# Patient Record
Sex: FEMALE | Race: WHITE | Employment: FULL TIME
[De-identification: names, ages, dates, MRNs, and addresses within clinical notes are randomized per-mention and may not be internally consistent; named-entity substitution may affect disease eponyms.]

---

## 2017-05-08 ENCOUNTER — HOSPITAL (OUTPATIENT)
Dept: OTHER | Age: 52
End: 2017-05-08
Attending: ORTHOPAEDIC SURGERY

## 2021-07-15 DIAGNOSIS — Z12.31 ENCOUNTER FOR SCREENING MAMMOGRAM FOR MALIGNANT NEOPLASM OF BREAST: Primary | ICD-10-CM

## 2021-08-03 ENCOUNTER — HOSPITAL ENCOUNTER (OUTPATIENT)
Dept: MAMMOGRAPHY | Age: 56
Discharge: HOME | End: 2021-08-03
Attending: OBSTETRICS & GYNECOLOGY

## 2021-08-03 DIAGNOSIS — Z12.31 ENCOUNTER FOR SCREENING MAMMOGRAM FOR MALIGNANT NEOPLASM OF BREAST: ICD-10-CM

## 2021-08-03 PROCEDURE — 77063 BREAST TOMOSYNTHESIS BI: CPT

## 2021-08-03 PROCEDURE — 77067 SCR MAMMO BI INCL CAD: CPT

## 2021-09-01 ENCOUNTER — APPOINTMENT (OUTPATIENT)
Dept: CT IMAGING | Facility: HOSPITAL | Age: 56
End: 2021-09-01
Attending: EMERGENCY MEDICINE
Payer: OTHER MISCELLANEOUS

## 2021-09-01 ENCOUNTER — HOSPITAL ENCOUNTER (EMERGENCY)
Facility: HOSPITAL | Age: 56
Discharge: HOME | End: 2021-09-01
Attending: EMERGENCY MEDICINE
Payer: OTHER MISCELLANEOUS

## 2021-09-01 VITALS
DIASTOLIC BLOOD PRESSURE: 77 MMHG | WEIGHT: 110 LBS | HEIGHT: 61 IN | RESPIRATION RATE: 16 BRPM | TEMPERATURE: 98 F | HEART RATE: 57 BPM | BODY MASS INDEX: 20.77 KG/M2 | SYSTOLIC BLOOD PRESSURE: 122 MMHG | OXYGEN SATURATION: 97 %

## 2021-09-01 DIAGNOSIS — S09.8XXA BLUNT HEAD TRAUMA, INITIAL ENCOUNTER: Primary | ICD-10-CM

## 2021-09-01 PROCEDURE — 99284 EMERGENCY DEPT VISIT MOD MDM: CPT

## 2021-09-01 PROCEDURE — 70450 CT HEAD/BRAIN W/O DYE: CPT | Performed by: EMERGENCY MEDICINE

## 2021-09-01 RX ORDER — HYDROCODONE BITARTRATE AND ACETAMINOPHEN 5; 325 MG/1; MG/1
1 TABLET ORAL ONCE
Status: COMPLETED | OUTPATIENT
Start: 2021-09-01 | End: 2021-09-01

## 2021-09-01 NOTE — ED PROVIDER NOTES
Patient Seen in: [facility] Emergency Department      History   Patient presents with:  Trauma    Stated Complaint: softball to head    HPI/Subjective:   HPI    23-year-old female with past medical history significant for asthma presents emergency Nondistended. Soft. Bowel sounds are normal.   Back:   : Musculoskeletal: Normal range of motion. No deformity. Lymphadenopathy: No sig cervical LAD   Neurological: Awake, alert. Normal reflexes. No cranial nerve deficit.     Skin: Skin is warm and dr

## 2021-09-01 NOTE — ED INITIAL ASSESSMENT (HPI)
Pt via EMS from school where she is a . Pt was struck in head by softball. No LOC. No blood thinners. Complains of dizziness and mild headache.

## 2021-10-31 ENCOUNTER — WALK IN (OUTPATIENT)
Dept: URGENT CARE | Age: 56
End: 2021-10-31
Attending: EMERGENCY MEDICINE

## 2021-10-31 VITALS
HEART RATE: 67 BPM | SYSTOLIC BLOOD PRESSURE: 142 MMHG | TEMPERATURE: 98 F | BODY MASS INDEX: 20.2 KG/M2 | DIASTOLIC BLOOD PRESSURE: 83 MMHG | OXYGEN SATURATION: 99 % | WEIGHT: 107 LBS | HEIGHT: 61 IN | RESPIRATION RATE: 18 BRPM

## 2021-10-31 DIAGNOSIS — L03.011 PARONYCHIA OF FINGER OF RIGHT HAND: Primary | ICD-10-CM

## 2021-10-31 PROCEDURE — 26010 DRAINAGE OF FINGER ABSCESS: CPT

## 2021-10-31 PROCEDURE — 99202 OFFICE O/P NEW SF 15 MIN: CPT

## 2021-10-31 RX ORDER — CEPHALEXIN 500 MG/1
500 CAPSULE ORAL 3 TIMES DAILY
Qty: 21 CAPSULE | Refills: 0 | Status: SHIPPED | OUTPATIENT
Start: 2021-10-31 | End: 2021-11-07

## 2021-10-31 RX ORDER — ALBUTEROL SULFATE 90 UG/1
AEROSOL, METERED RESPIRATORY (INHALATION)
COMMUNITY
Start: 2021-06-08

## 2021-10-31 RX ORDER — MONTELUKAST SODIUM 10 MG/1
TABLET ORAL
COMMUNITY
Start: 2021-08-09

## 2021-10-31 RX ORDER — ESTRADIOL 0.03 MG/D
FILM, EXTENDED RELEASE TRANSDERMAL
COMMUNITY
Start: 2021-08-25

## 2021-10-31 ASSESSMENT — PAIN SCALES - GENERAL: PAINLEVEL: 8

## 2023-12-18 ENCOUNTER — HOSPITAL ENCOUNTER (EMERGENCY)
Facility: HOSPITAL | Age: 58
Discharge: HOME | End: 2023-12-18
Attending: EMERGENCY MEDICINE
Payer: COMMERCIAL

## 2023-12-18 ENCOUNTER — APPOINTMENT (OUTPATIENT)
Dept: GENERAL RADIOLOGY | Facility: HOSPITAL | Age: 58
End: 2023-12-18
Payer: COMMERCIAL

## 2023-12-18 VITALS
HEART RATE: 81 BPM | TEMPERATURE: 98 F | DIASTOLIC BLOOD PRESSURE: 88 MMHG | SYSTOLIC BLOOD PRESSURE: 138 MMHG | OXYGEN SATURATION: 99 % | RESPIRATION RATE: 18 BRPM
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
## 2023-12-18 DIAGNOSIS — S61.452A DOG BITE, HAND, LEFT, INITIAL ENCOUNTER: Primary | ICD-10-CM

## 2023-12-18 PROCEDURE — 99284 EMERGENCY DEPT VISIT MOD MDM: CPT

## 2023-12-18 PROCEDURE — 73130 X-RAY EXAM OF HAND: CPT

## 2023-12-18 PROCEDURE — 12002 RPR S/N/AX/GEN/TRNK2.6-7.5CM: CPT

## 2023-12-18 PROCEDURE — 90471 IMMUNIZATION ADMIN: CPT

## 2023-12-18 RX ORDER — FEXOFENADINE HCL 180 MG/1
180 TABLET ORAL DAILY PRN
COMMUNITY

## 2023-12-18 RX ORDER — AMOXICILLIN AND CLAVULANATE POTASSIUM 875; 125 MG/1; MG/1
1 TABLET, FILM COATED ORAL 2 TIMES DAILY
Qty: 14 TABLET | Refills: 0 | Status: SHIPPED | OUTPATIENT
Start: 2023-12-18 | End: 2023-12-25

## 2023-12-18 RX ORDER — FLUTICASONE PROPIONATE AND SALMETEROL 250; 50 UG/1; UG/1
2 POWDER RESPIRATORY (INHALATION)
COMMUNITY

## 2023-12-18 RX ORDER — FLUTICASONE PROPIONATE AND SALMETEROL XINAFOATE 115; 21 UG/1; UG/1
AEROSOL, METERED RESPIRATORY (INHALATION)
COMMUNITY
Start: 2023-03-10

## 2023-12-18 RX ORDER — AMOXICILLIN AND CLAVULANATE POTASSIUM 875; 125 MG/1; MG/1
875 TABLET, FILM COATED ORAL ONCE
Status: COMPLETED | OUTPATIENT
Start: 2023-12-18 | End: 2023-12-18

## 2023-12-19 NOTE — ED INITIAL ASSESSMENT (HPI)
Pt to ER after she was bit on her left hand by her daughters dog. Bite marks & bruising noted to hand. Dog up to date on rabies vaccine.

## (undated) NOTE — LETTER
Date & Time: 9/1/2021, 10:07 AM  Patient: Yumi Fish  Encounter Provider(s):    Harry Kenyon MD       To Whom It May Concern:    Mario Mathews was seen and treated in our department on 9/1/2021. She should not return to work until 9/3/21.     If you